# Patient Record
Sex: FEMALE | Race: WHITE | NOT HISPANIC OR LATINO | Employment: FULL TIME | URBAN - NONMETROPOLITAN AREA
[De-identification: names, ages, dates, MRNs, and addresses within clinical notes are randomized per-mention and may not be internally consistent; named-entity substitution may affect disease eponyms.]

---

## 2018-08-28 ENCOUNTER — OFFICE VISIT (OUTPATIENT)
Dept: URGENT CARE | Facility: CLINIC | Age: 38
End: 2018-08-28
Payer: COMMERCIAL

## 2018-08-28 VITALS
RESPIRATION RATE: 18 BRPM | SYSTOLIC BLOOD PRESSURE: 144 MMHG | HEIGHT: 67 IN | HEART RATE: 72 BPM | TEMPERATURE: 98.3 F | BODY MASS INDEX: 23.7 KG/M2 | DIASTOLIC BLOOD PRESSURE: 72 MMHG | OXYGEN SATURATION: 100 % | WEIGHT: 151 LBS

## 2018-08-28 DIAGNOSIS — M54.42 ACUTE LEFT-SIDED LOW BACK PAIN WITH LEFT-SIDED SCIATICA: Primary | ICD-10-CM

## 2018-08-28 PROCEDURE — 99203 OFFICE O/P NEW LOW 30 MIN: CPT | Performed by: PHYSICIAN ASSISTANT

## 2018-08-28 RX ORDER — FLUOXETINE 10 MG/1
20 CAPSULE ORAL
COMMUNITY
Start: 2018-06-24

## 2018-08-28 RX ORDER — VILAZODONE HYDROCHLORIDE 20 MG/1
20 TABLET ORAL
COMMUNITY
Start: 2018-06-18

## 2018-08-28 RX ORDER — METHYLPREDNISOLONE 4 MG/1
TABLET ORAL
Qty: 21 TABLET | Refills: 0 | Status: SHIPPED | OUTPATIENT
Start: 2018-08-28

## 2018-08-28 NOTE — PROGRESS NOTES
[address] [facility]  ([facility] [phone]  (fax) [phone]        NAME: Jc Mcmillan is a 45 y o  female  : 1980    MRN: [id number]  DATE: 2018  TIME: 1:15 PM    Assessment and Plan   Acute left-sided low back pain with left-sided sciatica [M54 42]  1  Acute left-sided low back pain with left-sided sciatica  Methylprednisolone 4 MG TBPK       Patient Instructions   To take medrol dose pack as prescribed  If no improvement in symptoms in next 2-3 days to follow up with Dr Vangie Guallpa for further evaluation and consideration of advanced imaging of spine  Discussed injection therapy with patient for which she has tried in the past  Also discussed different medications such as lyrica and gabapentin with patient which she can further discuss with surgeon  Patient did verbalize understanding  To present to the ER if symptoms worsen  Chief Complaint     Chief Complaint   Patient presents with    Leg Pain     Radiates from R buttock down the R leg x 1 week   Peggie Gosselin LPN         History of Present Illness   Jc Mcmillan presents to the clinic c/o  Patient denies any bowel/bladder issues or numbness  Leg Pain    The incident occurred more than 1 week ago  Incident location: skipping down a mtn  The pain is present in the left leg (posterior thigh into calf and foot)  The quality of the pain is described as aching and shooting  The pain is moderate  The pain has been constant since onset  Associated symptoms include muscle weakness, numbness (left foot (worse than her normal numbness per patient)) and tingling  Pertinent negatives include no inability to bear weight, loss of motion or loss of sensation  She reports no foreign bodies present  The symptoms are aggravated by movement, palpation and weight bearing  She has tried NSAIDs for the symptoms  The treatment provided no relief         Review of Systems   Review of Systems Constitutional: Negative for activity change, appetite change, chills, diaphoresis, fatigue and fever  HENT: Negative for congestion, ear discharge, ear pain, facial swelling, rhinorrhea, sinus pain, sinus pressure, sneezing and sore throat  Eyes: Negative for photophobia, pain, discharge, redness, itching and visual disturbance  Respiratory: Negative for apnea, cough, chest tightness, shortness of breath and wheezing  Cardiovascular: Negative for chest pain  Gastrointestinal: Negative for abdominal distention, abdominal pain, anal bleeding, blood in stool, constipation, diarrhea, nausea and vomiting  Genitourinary: Negative for dysuria, flank pain, frequency, hematuria and urgency  Musculoskeletal: Positive for arthralgias and back pain  Negative for gait problem, joint swelling, myalgias, neck pain and neck stiffness  Skin: Negative for color change, rash and wound  Allergic/Immunologic: Negative for immunocompromised state  Neurological: Positive for tingling, weakness and numbness (left foot (worse than her normal numbness per patient))  Negative for dizziness, facial asymmetry and headaches  Hematological: Negative for adenopathy  Psychiatric/Behavioral: Negative for confusion and decreased concentration           Current Medications     Long-Term Prescriptions   Medication Sig Dispense Refill    FLUoxetine (PROZAC) 10 mg capsule Take 20 mg by mouth      vilazodone (VIIBRYD) 20 mg tablet Take 20 mg by mouth         Current Allergies     Allergies as of 08/28/2018    (No Known Allergies)            The following portions of the patient's history were reviewed and updated as appropriate: allergies, current medications, past family history, past medical history, past social history, past surgical history and problem list   Past Medical History:   Diagnosis Date    Anxiety     Depression     Disease of thyroid gland      Past Surgical History:   Procedure Laterality Date    BACK SURGERY       SECTION      TONSILLECTOMY      TOTAL THYROIDECTOMY       Social History     Social History    Marital status: /Civil Union     Spouse name: N/A    Number of children: N/A    Years of education: N/A     Occupational History    Not on file  Social History Main Topics    Smoking status: Never Smoker    Smokeless tobacco: Never Used    Alcohol use No    Drug use: No    Sexual activity: Not on file     Other Topics Concern    Not on file     Social History Narrative    No narrative on file       Objective   /72 (BP Location: Right arm, Patient Position: Sitting, Cuff Size: Standard)   Pulse 72   Temp 98 3 °F (36 8 °C) (Tympanic)   Resp 18   Ht 5' 6 5" (1 689 m)   Wt 68 5 kg (151 lb)   LMP 2018   SpO2 100%   BMI 24 01 kg/m²      Physical Exam     Physical Exam   Constitutional: She is oriented to person, place, and time  She appears well-developed and well-nourished  No distress  HENT:   Head: Normocephalic and atraumatic  Right Ear: Tympanic membrane and external ear normal    Left Ear: Tympanic membrane and external ear normal    Nose: Nose normal    Mouth/Throat: Oropharynx is clear and moist  No oropharyngeal exudate  Eyes: Conjunctivae and EOM are normal  Pupils are equal, round, and reactive to light  Right eye exhibits no discharge  Left eye exhibits no discharge  No scleral icterus  Neck: Normal range of motion  Neck supple  No JVD present  No tracheal deviation present  No thyromegaly present  Cardiovascular: Normal rate, regular rhythm and normal heart sounds  Exam reveals no gallop and no friction rub  No murmur heard  Pulmonary/Chest: Effort normal and breath sounds normal  No stridor  No respiratory distress  She has no wheezes  She has no rales  She exhibits no tenderness  Abdominal: Soft  Bowel sounds are normal  She exhibits no distension and no mass  There is no tenderness  There is no rebound and no guarding  Musculoskeletal: She exhibits tenderness  She exhibits no deformity  Lumbar back: She exhibits decreased range of motion (limited flexion and extension due to pain), tenderness (left lower lumbar region L3-4, L4-5, L5-S1), bony tenderness and pain  She exhibits no swelling, no edema, no deformity, no laceration, no spasm and normal pulse  Left SLR at 35 degrees, right SLR at 40 degrees; decreased sensation at left L5, S1; 4/5 flexion and extension strength of left leg, 5/5 strength of right leg    Lymphadenopathy:     She has no cervical adenopathy  Neurological: She is alert and oriented to person, place, and time  She has normal reflexes  Coordination normal    Skin: Skin is warm and dry  No rash noted  She is not diaphoretic  No erythema  No pallor  Psychiatric: She has a normal mood and affect  Her behavior is normal  Judgment and thought content normal    Nursing note and vitals reviewed        Nini Salas PA-C

## 2020-01-15 ENCOUNTER — TRANSCRIBE ORDERS (OUTPATIENT)
Dept: ADMINISTRATIVE | Facility: HOSPITAL | Age: 40
End: 2020-01-15

## 2020-01-15 DIAGNOSIS — R10.13 ABDOMINAL PAIN, EPIGASTRIC: Primary | ICD-10-CM

## 2020-01-23 ENCOUNTER — HOSPITAL ENCOUNTER (OUTPATIENT)
Dept: RADIOLOGY | Facility: HOSPITAL | Age: 40
Discharge: HOME | End: 2020-01-23
Payer: COMMERCIAL

## 2020-01-23 DIAGNOSIS — R10.13 ABDOMINAL PAIN, EPIGASTRIC: ICD-10-CM

## 2020-01-23 PROCEDURE — 74246 X-RAY XM UPR GI TRC 2CNTRST: CPT

## 2024-04-26 ENCOUNTER — OFFICE VISIT (OUTPATIENT)
Dept: OBGYN CLINIC | Facility: CLINIC | Age: 44
End: 2024-04-26
Payer: COMMERCIAL

## 2024-04-26 ENCOUNTER — DOCUMENTATION (OUTPATIENT)
Dept: OBGYN CLINIC | Facility: CLINIC | Age: 44
End: 2024-04-26

## 2024-04-26 VITALS
SYSTOLIC BLOOD PRESSURE: 131 MMHG | HEART RATE: 71 BPM | WEIGHT: 148 LBS | DIASTOLIC BLOOD PRESSURE: 69 MMHG | BODY MASS INDEX: 23.23 KG/M2 | HEIGHT: 67 IN

## 2024-04-26 DIAGNOSIS — G56.01 CARPAL TUNNEL SYNDROME OF RIGHT WRIST: Primary | ICD-10-CM

## 2024-04-26 PROCEDURE — 99203 OFFICE O/P NEW LOW 30 MIN: CPT | Performed by: ORTHOPAEDIC SURGERY

## 2024-04-26 NOTE — PROGRESS NOTES
Assessment/Plan:   Diagnoses and all orders for this visit:    Carpal tunnel syndrome of right wrist  -     EMG 1 Limb; Future         Discussed with patient that her physical exam is consistent with carpal tunnel syndrome of her right wrist. An EMG was ordered to further evaluate her symptoms. She may continue use of the night splint as tolerated. She will follow-up once the EMG is complete. The patient expresses understanding and is in agreement with today's treatment plan.     The patient has carpal tunnel syndrome of her right wrist.  Nerve conduction studies were ordered.  Return back once complete      Subjective:   Patient ID: Tracey Frost  1980     HPI  Patient is a 44 y.o. female who presents for initial evaluation of her right wrist. The patient report pain and numbness for the past 2 months.  She was given a nocturnal wrist splint by her PCP which does not help any significantly    The following portions of the patient's history were reviewed and updated as appropriate:  Past medical history, past surgical history, Family history, social history, current medications and allergies    Past Medical History:   Diagnosis Date    Anxiety     not an active issue    Depression     not an active issue    Disease of thyroid gland        Past Surgical History:   Procedure Laterality Date    BACK SURGERY      L4-5 laminectomy     SECTION      TONSILLECTOMY      TOTAL THYROIDECTOMY Bilateral 2000    benign goiter       Family History   Problem Relation Age of Onset    No Known Problems Mother     Diabetes Father        Social History     Socioeconomic History    Marital status: /Civil Union     Spouse name: None    Number of children: None    Years of education: None    Highest education level: None   Occupational History    None   Tobacco Use    Smoking status: Never    Smokeless tobacco: Never   Vaping Use    Vaping status: Never Used   Substance and Sexual Activity    Alcohol use: No    Drug  "use: No    Sexual activity: None   Other Topics Concern    None   Social History Narrative    None     Social Determinants of Health     Financial Resource Strain: Not on file   Food Insecurity: Not on file   Transportation Needs: Not on file   Physical Activity: Not on file   Stress: Not on file   Social Connections: Not on file   Intimate Partner Violence: Not on file   Housing Stability: Not on file         Current Outpatient Medications:     levothyroxine 75 mcg tablet, Take 75 mcg by mouth daily, Disp: , Rfl:     FLUoxetine (PROZAC) 10 mg capsule, Take 20 mg by mouth (Patient not taking: Reported on 4/26/2024), Disp: , Rfl:     Methylprednisolone 4 MG TBPK, Use as directed on package (Patient not taking: Reported on 7/26/2021), Disp: 21 tablet, Rfl: 0    vilazodone (VIIBRYD) 20 mg tablet, Take 20 mg by mouth (Patient not taking: Reported on 7/26/2021), Disp: , Rfl:     No Known Allergies    Review of Systems   Constitutional:  Negative for chills, fever and unexpected weight change.   HENT:  Negative for hearing loss, nosebleeds and sore throat.    Eyes:  Negative for pain, redness and visual disturbance.   Respiratory:  Negative for cough, shortness of breath and wheezing.    Cardiovascular:  Negative for chest pain, palpitations and leg swelling.   Gastrointestinal:  Negative for abdominal pain, nausea and vomiting.   Endocrine: Negative for polydipsia and polyuria.   Genitourinary:  Negative for dysuria and hematuria.   Skin:  Negative for rash and wound.   Neurological:  Negative for dizziness, numbness and headaches.   Psychiatric/Behavioral:  Negative for decreased concentration and suicidal ideas. The patient is not nervous/anxious.    All other systems reviewed and are negative.       Objective:  /69   Pulse 71   Ht 5' 7\" (1.702 m)   Wt 67.1 kg (148 lb)   BMI 23.18 kg/m²     Ortho Exam  right wrist -  Patient presents with no obvious anatomical deformity  Skin is warm and dry to touch with no " signs of erythema, ecchymosis, infection  ROM WNL  Strength: 4/5 throughout  No soft tissue swelling or effusion noted  Full FDS, FDP, extensor mechanisms are intact  - Thenar atrophy, - intrinsic atrophy  + Tinel's at carpal tunnel  - Tinel's at cubital tunnel   + Phalen's sign  + Carpal Tunnel Compression  Demonstrates normal wrist, elbow, and shoulder motion  Forearm compartments are soft and supple  2+ Distal radial pulse with brisk capillary refill to the fingers  Radial, median, ulnar motor and sensory distribution intact  Sensation to light touch intact distally      Physical Exam  HENT:      Head: Normocephalic and atraumatic.      Nose: Nose normal.   Eyes:      Conjunctiva/sclera: Conjunctivae normal.   Cardiovascular:      Rate and Rhythm: Normal rate.   Pulmonary:      Effort: Pulmonary effort is normal.   Musculoskeletal:      Cervical back: Neck supple.   Skin:     General: Skin is warm and dry.      Capillary Refill: Capillary refill takes less than 2 seconds.   Neurological:      Mental Status: She is alert and oriented to person, place, and time.   Psychiatric:         Mood and Affect: Mood normal.         Behavior: Behavior normal.          Diagnostic Test Review:  No new imaging at time of visit.     Procedures   None performed.    Scribe Attestation      I,:   am acting as a scribe while in the presence of the attending physician.:       I,:   personally performed the services described in this documentation    as scribed in my presence.:

## 2024-09-18 ENCOUNTER — APPOINTMENT (OUTPATIENT)
Dept: RADIOLOGY | Facility: CLINIC | Age: 44
End: 2024-09-18
Payer: OTHER MISCELLANEOUS

## 2024-09-18 ENCOUNTER — OCCMED (OUTPATIENT)
Dept: URGENT CARE | Facility: CLINIC | Age: 44
End: 2024-09-18
Payer: OTHER MISCELLANEOUS

## 2024-09-18 DIAGNOSIS — S99.922A INJURY OF TOE ON LEFT FOOT, INITIAL ENCOUNTER: Primary | ICD-10-CM

## 2024-09-18 DIAGNOSIS — S99.922A INJURY OF TOE ON LEFT FOOT, INITIAL ENCOUNTER: ICD-10-CM

## 2024-09-18 PROCEDURE — G0383 LEV 4 HOSP TYPE B ED VISIT: HCPCS | Performed by: PHYSICIAN ASSISTANT

## 2024-09-18 PROCEDURE — 73660 X-RAY EXAM OF TOE(S): CPT

## 2024-09-18 PROCEDURE — 99284 EMERGENCY DEPT VISIT MOD MDM: CPT | Performed by: PHYSICIAN ASSISTANT

## 2024-09-20 ENCOUNTER — OFFICE VISIT (OUTPATIENT)
Dept: ENDOCRINOLOGY | Facility: CLINIC | Age: 44
End: 2024-09-20
Payer: COMMERCIAL

## 2024-09-20 ENCOUNTER — APPOINTMENT (OUTPATIENT)
Dept: LAB | Facility: CLINIC | Age: 44
End: 2024-09-20
Payer: COMMERCIAL

## 2024-09-20 VITALS
DIASTOLIC BLOOD PRESSURE: 80 MMHG | BODY MASS INDEX: 23.18 KG/M2 | SYSTOLIC BLOOD PRESSURE: 120 MMHG | OXYGEN SATURATION: 99 % | HEIGHT: 67 IN | HEART RATE: 74 BPM

## 2024-09-20 DIAGNOSIS — E03.8 HYPOTHYROIDISM DUE TO HASHIMOTO'S THYROIDITIS: Primary | ICD-10-CM

## 2024-09-20 DIAGNOSIS — E06.3 HYPOTHYROIDISM DUE TO HASHIMOTO'S THYROIDITIS: ICD-10-CM

## 2024-09-20 DIAGNOSIS — E55.9 VITAMIN D DEFICIENCY: ICD-10-CM

## 2024-09-20 DIAGNOSIS — R60.9 WATER RETENTION: ICD-10-CM

## 2024-09-20 DIAGNOSIS — E06.3 HYPOTHYROIDISM DUE TO HASHIMOTO'S THYROIDITIS: Primary | ICD-10-CM

## 2024-09-20 DIAGNOSIS — C73 THYROID CANCER (HCC): ICD-10-CM

## 2024-09-20 LAB
25(OH)D3 SERPL-MCNC: 12.3 NG/ML (ref 30–100)
T4 FREE SERPL-MCNC: 1.03 NG/DL (ref 0.61–1.12)
TSH SERPL DL<=0.05 MIU/L-ACNC: 1 UIU/ML (ref 0.45–4.5)

## 2024-09-20 PROCEDURE — 84207 ASSAY OF VITAMIN B-6: CPT

## 2024-09-20 PROCEDURE — 99205 OFFICE O/P NEW HI 60 MIN: CPT | Performed by: INTERNAL MEDICINE

## 2024-09-20 PROCEDURE — 84439 ASSAY OF FREE THYROXINE: CPT

## 2024-09-20 PROCEDURE — 84432 ASSAY OF THYROGLOBULIN: CPT

## 2024-09-20 PROCEDURE — 86376 MICROSOMAL ANTIBODY EACH: CPT

## 2024-09-20 PROCEDURE — 36415 COLL VENOUS BLD VENIPUNCTURE: CPT

## 2024-09-20 PROCEDURE — 86800 THYROGLOBULIN ANTIBODY: CPT

## 2024-09-20 PROCEDURE — 82306 VITAMIN D 25 HYDROXY: CPT

## 2024-09-20 PROCEDURE — 84443 ASSAY THYROID STIM HORMONE: CPT

## 2024-09-20 RX ORDER — LEVOTHYROXINE SODIUM 100 UG/1
100 TABLET ORAL DAILY
COMMUNITY
End: 2024-09-26 | Stop reason: CLARIF

## 2024-09-20 NOTE — PROGRESS NOTES
Tracey Frost 44 y.o. female MRN: [id number]    Encounter: [id number]      Assessment & Plan     Assessment:  This is a 44 y.o.-year-old female with hypothyroidism.    Plan:  Diagnoses and all orders for this visit:    Hypothyroidism due to Hashimoto's thyroiditis    Lab Results   Component Value Date    LNE7WOKRUAUC 0.010 (L) 06/30/2015    TSH 32.18 (H) 08/03/2024   Uncontrolled  Patient has history of fluctuating TSH as well as free T4.  Currently she is on generic levothyroxine.  She is taking 100 mcg of levothyroxine her dose was recently increased 5 to 6 weeks ago.  Will obtain TSH, free T4, thyroglobulin tumor marker.  She will need adjustment in dose based on the results.  Goal for TSH considering she has history of thyroid cancer is 0.5-2.0    She would Benefit from switching to brand Synthroid    I have discussed the thyroid, thyroid cancer, prognosis, potential treatment and long-term follow-up.   Discussed the importance of TSH suppression and thyroid cancer  -     T4, free; Future  -     TSH, 3rd generation; Future  -     Thyroid Antibodies Panel; Future  -     T4, free; Future  -     TSH, 3rd generation; Future    Vitamin D deficiency  Obtain vitamin D level, will need to replace vitamin D3 supplementation based on results  -     Vitamin D 25 hydroxy; Future    Thyroid cancer (HCC)  Pathology is unknown, as per patient she also was given radioactive iodine therapy.  Will obtain thyroglobulin tumor marker as baseline and follow the trend.  Will obtain neck ultrasound for lymph node mapping  -     US head neck lymph node mapping; Future  -     Thyroglobulin; Future  -     Thyroglobulin; Future    Water retention  Obtain vitamin B6 level  -     Vitamin B6; Future    Other orders  -     levothyroxine (Levoxyl) 100 mcg tablet; Take 100 mcg by mouth daily       I have spent a total time of 60 minutes in caring for this patient on the day of the visit/encounter including Diagnostic results, Prognosis, Risks  and benefits of tx options, Instructions for management, Patient and family education, Importance of tx compliance, Risk factor reductions, Impressions, Counseling / Coordination of care, Documenting in the medical record, Reviewing / ordering tests, medicine, procedures  , and Obtaining or reviewing history  .   CC:   Hypothyroidism, thyroid cancer    History of Present Illness     HPI:  Tracey Frost is 44 yr old woman with medical history of postsurgical hypothyroidism, history of total thyroidectomy for thyroid cancer diagnosed in June 2020 is here for establishing visit for management of postsurgical hypothyroidism and thyroid cancer.    Pathology report is not available from 2020  There is no recent record of thyroglobulin level or neck ultrasound done  Family history of thyroid cancer.  She denies exposure to radiation to the neck or face area  She gives history of I-131 therapy after total thyroidectomy     Her current dose of levothyroxine is 100 mcg daily.    She takes it on empty stomach 1 hour before breakfast.      She denies family history of thyroid cancer.    She has positive family history of hypothyroidism     no history of infertility, she has 3 children, normal pregnancies  She has history of total thyroidectomy in 2000, for thyroid cancer.    Review of Systems   Constitutional:  Negative for activity change, diaphoresis, fatigue, fever and unexpected weight change.   HENT: Negative.     Eyes:  Negative for visual disturbance.   Respiratory:  Negative for cough, chest tightness and shortness of breath.    Cardiovascular:  Negative for chest pain, palpitations and leg swelling.   Gastrointestinal:  Negative for abdominal pain, blood in stool, constipation, diarrhea, nausea and vomiting.   Endocrine: Negative for cold intolerance, heat intolerance, polydipsia, polyphagia and polyuria.   Genitourinary:  Negative for dysuria, enuresis, frequency and urgency.   Musculoskeletal:  Negative for  "arthralgias and myalgias.   Skin:  Negative for pallor, rash and wound.   Allergic/Immunologic: Negative.    Neurological:  Negative for dizziness, tremors, weakness and numbness.   Hematological: Negative.    Psychiatric/Behavioral:  Positive for sleep disturbance.        Historical Information   Past Medical History:   Diagnosis Date    Anxiety     not an active issue    Depression     not an active issue    Disease of thyroid gland      Past Surgical History:   Procedure Laterality Date    BACK SURGERY      L4-5 laminectomy     SECTION      TONSILLECTOMY      TOTAL THYROIDECTOMY Bilateral 2000    benign goiter     Social History   Social History     Substance and Sexual Activity   Alcohol Use No     Social History     Substance and Sexual Activity   Drug Use No     Social History     Tobacco Use   Smoking Status Never   Smokeless Tobacco Never     Family History:   Family History   Problem Relation Age of Onset    No Known Problems Mother     Diabetes Father        Meds/Allergies   Current Outpatient Medications   Medication Sig Dispense Refill    levothyroxine (Levoxyl) 100 mcg tablet Take 100 mcg by mouth daily      FLUoxetine (PROZAC) 10 mg capsule Take 20 mg by mouth (Patient not taking: Reported on 2024)      Methylprednisolone 4 MG TBPK Use as directed on package (Patient not taking: Reported on 2021) 21 tablet 0    vilazodone (VIIBRYD) 20 mg tablet Take 20 mg by mouth (Patient not taking: Reported on 2021)       No current facility-administered medications for this visit.     No Known Allergies    Objective   Vitals: Blood pressure 120/80, pulse 74, height 5' 7\" (1.702 m), SpO2 99%.    Physical Exam  Constitutional:       General: She is not in acute distress.     Appearance: She is well-developed. She is not diaphoretic.   HENT:      Head: Normocephalic and atraumatic.   Eyes:      General:         Right eye: No discharge.         Left eye: No discharge.      Extraocular Movements: " "EOM normal.      Conjunctiva/sclera: Conjunctivae normal.      Pupils: Pupils are equal, round, and reactive to light.   Neck:      Thyroid: No thyromegaly.      Trachea: No tracheal deviation.   Cardiovascular:      Rate and Rhythm: Normal rate and regular rhythm.      Heart sounds: Normal heart sounds. No murmur heard.     No friction rub.   Pulmonary:      Effort: Pulmonary effort is normal. No respiratory distress.      Breath sounds: Normal breath sounds. No wheezing or rales.   Chest:      Chest wall: No tenderness.   Abdominal:      General: Bowel sounds are normal. There is no distension.      Palpations: Abdomen is soft.      Tenderness: There is no abdominal tenderness.   Musculoskeletal:         General: No tenderness, deformity or edema. Normal range of motion.      Cervical back: Normal range of motion and neck supple.   Lymphadenopathy:      Cervical: No cervical adenopathy.   Skin:     General: Skin is warm and dry.      Coloration: Skin is not pale.      Findings: No erythema or rash.   Neurological:      Mental Status: She is alert and oriented to person, place, and time.      Motor: No abnormal muscle tone.      Coordination: Coordination normal.      Deep Tendon Reflexes: Reflexes are normal and symmetric. Reflexes normal.   Psychiatric:         Mood and Affect: Mood and affect normal.         Behavior: Behavior normal.         The history was obtained from the review of the chart, patient.    Lab Results:        Imaging Studies:       Reviewed radiology reports from this admission including: MRI spine.    Portions of the record may have been created with voice recognition software. Occasional wrong word or \"sound a like\" substitutions may have occurred due to the inherent limitations of voice recognition software. Read the chart carefully and recognize, using context, where substitutions have occurred.    "

## 2024-09-21 LAB
THYROGLOB AB SERPL-ACNC: <1 IU/ML (ref 0–0.9)
THYROGLOB AB SERPL-ACNC: <1 IU/ML (ref 0–0.9)
THYROGLOB SERPL-MCNC: <0.1 NG/ML (ref 1.5–38.5)

## 2024-09-22 LAB — THYROPEROXIDASE AB SERPL-ACNC: 9 IU/ML (ref 0–34)

## 2024-09-25 ENCOUNTER — PATIENT MESSAGE (OUTPATIENT)
Dept: ENDOCRINOLOGY | Facility: CLINIC | Age: 44
End: 2024-09-25

## 2024-09-25 ENCOUNTER — APPOINTMENT (OUTPATIENT)
Dept: URGENT CARE | Facility: CLINIC | Age: 44
End: 2024-09-25
Payer: OTHER MISCELLANEOUS

## 2024-09-25 DIAGNOSIS — E06.3 HYPOTHYROIDISM DUE TO HASHIMOTO'S THYROIDITIS: Primary | ICD-10-CM

## 2024-09-25 DIAGNOSIS — E03.8 HYPOTHYROIDISM DUE TO HASHIMOTO'S THYROIDITIS: Primary | ICD-10-CM

## 2024-09-25 LAB — VIT B6 SERPL-MCNC: 5 UG/L (ref 3.4–65.2)

## 2024-09-25 PROCEDURE — 99213 OFFICE O/P EST LOW 20 MIN: CPT | Performed by: PHYSICIAN ASSISTANT

## 2024-09-26 ENCOUNTER — TELEPHONE (OUTPATIENT)
Age: 44
End: 2024-09-26

## 2024-09-26 RX ORDER — LEVOTHYROXINE SODIUM 100 MCG
100 TABLET ORAL DAILY
Qty: 90 TABLET | Refills: 1 | Status: SHIPPED | OUTPATIENT
Start: 2024-09-26

## 2024-09-26 NOTE — TELEPHONE ENCOUNTER
Caller: Cadence Engel Nurse  [facility].    Doctor and/or Office: Dr. Treadwell/Mark    #: [phone]    Escalation: Appointment Patient has a nondisplaced left great toe fracture. Requesting an appt in [place]. How soon should patient be seen? Please return call to patient Tracey to schedule.   This is workmans comp. Once she has an appt, claim info will be updated in system by Kristy at [facility]. Thank you

## 2024-10-03 ENCOUNTER — OFFICE VISIT (OUTPATIENT)
Age: 44
End: 2024-10-03
Payer: OTHER MISCELLANEOUS

## 2024-10-03 VITALS — DIASTOLIC BLOOD PRESSURE: 75 MMHG | HEART RATE: 92 BPM | SYSTOLIC BLOOD PRESSURE: 113 MMHG

## 2024-10-03 DIAGNOSIS — M79.675 PAIN OF LEFT GREAT TOE: Primary | ICD-10-CM

## 2024-10-03 PROCEDURE — 99203 OFFICE O/P NEW LOW 30 MIN: CPT

## 2024-10-03 NOTE — PROGRESS NOTES
Ambulatory Visit  Name: Tracey Frost      : 1980      MRN: [id number]  Encounter Provider: Richard Treadwell DPM  Encounter Date: 10/3/2024   Encounter department: [facility] PODIAT[facility]    Assessment & Plan  Pain of left great toe    Orders:    XR toe left great min 2 views; Future    Post Op Shoe    Treatment Plan:     Rest, ice, and elevation  Apply a cloth covered ice pack to heel four times per day 15-30 minutes.     My personal interpretation today of the x-rays that were taken show no acute osseous abnormality    Patient's x-rays show fracture that can be treated conservatively.  I discussed with the patient risks and benefits of treating this fracture conservatively.    Patient should wear device that was dispensed today for protection of the area.    Weightbearing status as tolerated in surgical shoe    Plan on rechecking x-rays in 2 weeks.      I personally discussed with patient at the visit today:     The diagnosis of this encounter  2.   Possible etiologies of the diagnosis  3.   Treatment options including advantages and disadvantages of treatment with potential risks and complications associated with these treatments  4.   Prevention strategies and ways to decrease pain     I answered all of the patients questions.     History of Present Illness     Tracey Frost is a 44 y.o. female who presents left great toe pain.  Patient initially injured her great toe 2024, she is here for further evaluation today she states there has been some improvement in pain.  She reports that still hurts to bend but otherwise is doing well.      History obtained from : patient  Review of Systems  Current Outpatient Medications on File Prior to Visit   Medication Sig Dispense Refill    Synthroid 100 MCG tablet Take 1 tablet (100 mcg total) by mouth daily 90 tablet 1    FLUoxetine (PROZAC) 10 mg capsule Take 20 mg by mouth (Patient not taking: Reported on 2024)      Methylprednisolone 4  MG TBPK Use as directed on package (Patient not taking: Reported on 7/26/2021) 21 tablet 0    vilazodone (VIIBRYD) 20 mg tablet Take 20 mg by mouth (Patient not taking: Reported on 7/26/2021)       No current facility-administered medications on file prior to visit.          Objective     /75 (BP Location: Left arm, Patient Position: Sitting, Cuff Size: Large)   Pulse 92     Physical Exam  Vascular: Intact pedal pulses bilateral DP and PT.  Neurological: Gross protective sensation intact bilateral  Musculoskeletal: Muscle strength bilateral intact with dorsiflexion, inversion, eversion and plantarflexion.  Tenderness on palpation noted at the level of the foot fracture left great toe.  There is no tenderness on palpation of the Lisfranc ligament.  No tenderness with palpation at the level of the ankle medially or laterally.  No tenderness with tib-fib squeeze.  No other areas of significant discomfort or tenderness on examination.  Dermatological: No open lesions or ulcerations noted bilateral.

## 2024-10-03 NOTE — LETTER
October 3, 2024     Patient: Tracey Frost  YOB: 1980  Date of Visit: 10/3/2024      To Whom it May Concern:    Tracey Frost is under my professional care. Tracey was seen in my office on 10/3/2024. Tracey may return to work with limitations 30% weightbearing, must remain in surgical shoe . Has follow up appointment in 2 weeks.    If you have any questions or concerns, please don't hesitate to call.         Sincerely,          Richard Treadwell DPM        CC: No Recipients

## 2024-10-21 ENCOUNTER — OFFICE VISIT (OUTPATIENT)
Age: 44
End: 2024-10-21
Payer: OTHER MISCELLANEOUS

## 2024-10-21 VITALS
HEIGHT: 67 IN | DIASTOLIC BLOOD PRESSURE: 76 MMHG | HEART RATE: 80 BPM | WEIGHT: 148 LBS | SYSTOLIC BLOOD PRESSURE: 117 MMHG | BODY MASS INDEX: 23.23 KG/M2

## 2024-10-21 DIAGNOSIS — M79.675 PAIN OF LEFT GREAT TOE: Primary | ICD-10-CM

## 2024-10-21 PROCEDURE — 99213 OFFICE O/P EST LOW 20 MIN: CPT

## 2024-10-21 NOTE — LETTER
October 31, 2024     Patient: Tracey Frost  YOB: 1980  Date of Visit: 10/21/2024      To Whom it May Concern:    Tracey Frost is under my professional care. Tracey was seen in my office on 10/21/2024. Tracey may return to work with no limitations. Patient is fully healed.    If you have any questions or concerns, please don't hesitate to call.         Sincerely,          Richard Treadwell DPM        CC: No Recipients

## 2024-10-21 NOTE — PROGRESS NOTES
Ambulatory Visit  Name: Tracey Frost      : 1980      MRN: [id number]  Encounter Provider: Richard Treadwell DPM  Encounter Date: 10/21/2024   Encounter department: [facility] PODIATRY [facility]    Assessment & Plan  Pain of left great toe         Treatment Plan:     Rest, ice, and elevation  Apply a cloth covered ice pack to heel four times per day 15-30 minutes.     My personal interpretation today of the x-rays that were taken show no acute osseous abnormality    Patient's x-rays show fracture that can be treated conservatively.  I discussed with the patient risks and benefits of treating this fracture conservatively.    Patient should wear device that was dispensed today for protection of the area.    Weightbearing status as tolerated in surgical shoe    Plan on rechecking x-rays in 2 weeks.      I personally discussed with patient at the visit today:     The diagnosis of this encounter  2.   Possible etiologies of the diagnosis  3.   Treatment options including advantages and disadvantages of treatment with potential risks and complications associated with these treatments  4.   Prevention strategies and ways to decrease pain     I answered all of the patients questions.     History of Present Illness     Tracey Frost is a 44 y.o. female who presents left great toe pain.  Patient initially injured her great toe 2024, she is here for further evaluation today she states there has been some improvement in pain.  She reports that still hurts to bend but otherwise is doing well.      History obtained from : patient  Review of Systems  Current Outpatient Medications on File Prior to Visit   Medication Sig Dispense Refill    Synthroid 100 MCG tablet Take 1 tablet (100 mcg total) by mouth daily 90 tablet 1    FLUoxetine (PROZAC) 10 mg capsule Take 20 mg by mouth (Patient not taking: Reported on 2024)      Methylprednisolone 4 MG TBPK Use as directed on package (Patient not taking:  "Reported on 7/26/2021) 21 tablet 0    vilazodone (VIIBRYD) 20 mg tablet Take 20 mg by mouth (Patient not taking: Reported on 7/26/2021)       No current facility-administered medications on file prior to visit.          Objective     /76 (BP Location: Left arm, Patient Position: Sitting, Cuff Size: Large)   Pulse 80   Ht 5' 7\" (1.702 m)   Wt 67.1 kg (148 lb)   BMI 23.18 kg/m²     Physical Exam  Vascular: Intact pedal pulses bilateral DP and PT.  Neurological: Gross protective sensation intact bilateral  Musculoskeletal: Muscle strength bilateral intact with dorsiflexion, inversion, eversion and plantarflexion.  Tenderness on palpation noted at the level of the foot fracture left great toe.  There is no tenderness on palpation of the Lisfranc ligament.  No tenderness with palpation at the level of the ankle medially or laterally.  No tenderness with tib-fib squeeze.  No other areas of significant discomfort or tenderness on examination.  Dermatological: No open lesions or ulcerations noted bilateral.  "

## 2024-10-30 ENCOUNTER — TELEPHONE (OUTPATIENT)
Age: 44
End: 2024-10-30

## 2024-10-30 NOTE — TELEPHONE ENCOUNTER
Caller: Tracey Frost    Doctor: Mile / Mark    Reason for call: Tracey needs a note saying that she is able to go back to work.  That she has no more work limitations and that she is completely healed.  Can we please provide Tracey with this note?  Please send to her my chart.  Thank you.      Call back#: [phone]

## 2024-12-27 DIAGNOSIS — E06.3 HYPOTHYROIDISM DUE TO HASHIMOTO'S THYROIDITIS: ICD-10-CM

## 2024-12-27 RX ORDER — LEVOTHYROXINE SODIUM 100 MCG
100 TABLET ORAL DAILY
Qty: 90 TABLET | Refills: 1 | Status: SHIPPED | OUTPATIENT
Start: 2024-12-27

## 2025-03-15 ENCOUNTER — OFFICE VISIT (OUTPATIENT)
Dept: URGENT CARE | Facility: CLINIC | Age: 45
End: 2025-03-15
Payer: COMMERCIAL

## 2025-03-15 VITALS
DIASTOLIC BLOOD PRESSURE: 72 MMHG | HEIGHT: 67 IN | WEIGHT: 209 LBS | RESPIRATION RATE: 18 BRPM | TEMPERATURE: 98.2 F | SYSTOLIC BLOOD PRESSURE: 124 MMHG | HEART RATE: 100 BPM | OXYGEN SATURATION: 98 % | BODY MASS INDEX: 32.8 KG/M2

## 2025-03-15 DIAGNOSIS — R30.0 DYSURIA: Primary | ICD-10-CM

## 2025-03-15 LAB
SL AMB  POCT GLUCOSE, UA: ABNORMAL
SL AMB LEUKOCYTE ESTERASE,UA: ABNORMAL
SL AMB POCT BILIRUBIN,UA: ABNORMAL
SL AMB POCT BLOOD,UA: ABNORMAL
SL AMB POCT CLARITY,UA: ABNORMAL
SL AMB POCT COLOR,UA: YELLOW
SL AMB POCT KETONES,UA: 1
SL AMB POCT NITRITE,UA: ABNORMAL
SL AMB POCT PH,UA: 7
SL AMB POCT SPECIFIC GRAVITY,UA: 1
SL AMB POCT URINE PROTEIN: ABNORMAL
SL AMB POCT UROBILINOGEN: 0.2

## 2025-03-15 PROCEDURE — 99213 OFFICE O/P EST LOW 20 MIN: CPT

## 2025-03-15 PROCEDURE — 87086 URINE CULTURE/COLONY COUNT: CPT

## 2025-03-15 PROCEDURE — 81002 URINALYSIS NONAUTO W/O SCOPE: CPT

## 2025-03-15 RX ORDER — NITROFURANTOIN 25; 75 MG/1; MG/1
100 CAPSULE ORAL 2 TIMES DAILY
Qty: 10 CAPSULE | Refills: 0 | Status: SHIPPED | OUTPATIENT
Start: 2025-03-15 | End: 2025-03-20

## 2025-03-15 NOTE — PATIENT INSTRUCTIONS
Please take Nitrofurantoin 100 mg twice daily for the next 5 days.    Drink plenty of water and other fluids to help flush out bacteria and dilute your urine.    You may take OTC pain relievers such as Tylenol or Ibuprofen, and use a warm heating pad to help with your pain.  Avoid drinks that may irritate your bladder including coffee, alcohol, and citrus drinks.  Drinking cranberry juice or taking cranberry products may help reduce recurrent UTI's.      Please follow up with PCP or proceed to the ER for worsening symptoms (fever, chills, back/pelvic pain, and bloody urine).

## 2025-03-15 NOTE — PROGRESS NOTES
[facility]        NAME: Tracey Frost is a 45 y.o. female  : 1980    MRN: [id number]  DATE: March 15, 2025  TIME: 11:16 AM    Assessment and Plan   Dysuria [R30.0]  1. Dysuria  POCT urine dip    Urine culture    Urine culture    nitrofurantoin (MACROBID) 100 mg capsule        Urinalysis in office shows small amount of leukocytes and blood.  Will send urine culture.  Will treat empirically with Macrobid.  Recommend rest and increase fluid intake. Instructed patient to follow-up with PCP for no improvement or worsening of symptoms.  Patient educated on red flag symptoms and when to proceed to the ED.  Patient agreeable and understands current treatment plan.     Patient Instructions     Patient Instructions   Please take Nitrofurantoin 100 mg twice daily for the next 5 days.    Drink plenty of water and other fluids to help flush out bacteria and dilute your urine.    You may take OTC pain relievers such as Tylenol or Ibuprofen, and use a warm heating pad to help with your pain.  Avoid drinks that may irritate your bladder including coffee, alcohol, and citrus drinks.  Drinking cranberry juice or taking cranberry products may help reduce recurrent UTI's.      Please follow up with PCP or proceed to the ER for worsening symptoms (fever, chills, back/pelvic pain, and bloody urine).        Follow up with PCP in 3-5 days.  Proceed to  ER if symptoms worsen.    Chief Complaint     Chief Complaint   Patient presents with   • Possible UTI     Started today with feeling the need to urinate.          History of Present Illness       45-year-old female presents to the clinic for evaluation of urinary symptoms x 1 day.  Patient reports urinary frequency, hesitancy and urgency.  Patient denies any fevers, chills, burning with urination, hematuria, flank pain, back pain, pelvic pain, nausea, vomiting or diarrhea.  Patient denies any other symptoms at this time.  She states she is not taking any OTC medications  for symptoms.              Review of Systems   Review of Systems   Constitutional:  Negative for chills and fever.   Respiratory:  Negative for cough and shortness of breath.    Cardiovascular:  Negative for chest pain and palpitations.   Gastrointestinal:  Negative for abdominal pain, diarrhea, nausea and vomiting.   Genitourinary:  Positive for frequency and urgency. Negative for dysuria, flank pain, hematuria and pelvic pain.   Musculoskeletal:  Negative for arthralgias and myalgias.   Neurological:  Negative for dizziness, light-headedness and headaches.         Current Medications       Current Outpatient Medications:   •  nitrofurantoin (MACROBID) 100 mg capsule, Take 1 capsule (100 mg total) by mouth 2 (two) times a day for 5 days, Disp: 10 capsule, Rfl: 0  •  Synthroid 100 MCG tablet, Take 1 tablet (100 mcg total) by mouth daily, Disp: 90 tablet, Rfl: 1  •  FLUoxetine (PROZAC) 10 mg capsule, Take 20 mg by mouth (Patient not taking: Reported on 2024), Disp: , Rfl:   •  Methylprednisolone 4 MG TBPK, Use as directed on package (Patient not taking: Reported on 2021), Disp: 21 tablet, Rfl: 0  •  vilazodone (VIIBRYD) 20 mg tablet, Take 20 mg by mouth (Patient not taking: Reported on 2021), Disp: , Rfl:     Current Allergies     Allergies as of 03/15/2025   • (No Known Allergies)            The following portions of the patient's history were reviewed and updated as appropriate: allergies, current medications, past family history, past medical history, past social history, past surgical history and problem list.     Past Medical History:   Diagnosis Date   • Anxiety     not an active issue   • Depression     not an active issue   • Disease of thyroid gland        Past Surgical History:   Procedure Laterality Date   • BACK SURGERY      L4-5 laminectomy   •  SECTION     • TONSILLECTOMY     • TOTAL THYROIDECTOMY Bilateral 2000    benign goiter       Family History   Problem Relation Age of Onset  "  • No Known Problems Mother    • Diabetes Father          Medications have been verified.        Objective   /72   Pulse 100   Temp 98.2 °F (36.8 °C)   Resp 18   Ht 5' 7\" (1.702 m)   Wt 94.8 kg (209 lb)   SpO2 98%   BMI 32.73 kg/m²        Physical Exam     Physical Exam  Vitals and nursing note reviewed.   Constitutional:       Appearance: Normal appearance.   HENT:      Head: Normocephalic and atraumatic.   Cardiovascular:      Rate and Rhythm: Normal rate and regular rhythm.      Pulses: Normal pulses.      Heart sounds: Normal heart sounds.   Pulmonary:      Effort: Pulmonary effort is normal.      Breath sounds: Normal breath sounds.   Abdominal:      General: Bowel sounds are normal. There is no distension.      Palpations: Abdomen is soft.      Tenderness: There is no abdominal tenderness. There is no right CVA tenderness, left CVA tenderness or guarding.   Skin:     General: Skin is warm and dry.   Neurological:      General: No focal deficit present.      Mental Status: She is alert.   Psychiatric:         Mood and Affect: Mood normal.         Behavior: Behavior normal.                   "

## 2025-03-16 LAB — BACTERIA UR CULT: NORMAL

## 2025-03-24 ENCOUNTER — APPOINTMENT (OUTPATIENT)
Dept: LAB | Facility: CLINIC | Age: 45
End: 2025-03-24
Payer: COMMERCIAL

## 2025-03-24 ENCOUNTER — OFFICE VISIT (OUTPATIENT)
Dept: ENDOCRINOLOGY | Facility: CLINIC | Age: 45
End: 2025-03-24
Payer: COMMERCIAL

## 2025-03-24 VITALS
DIASTOLIC BLOOD PRESSURE: 80 MMHG | WEIGHT: 210 LBS | HEIGHT: 67 IN | BODY MASS INDEX: 32.96 KG/M2 | HEART RATE: 65 BPM | OXYGEN SATURATION: 94 % | SYSTOLIC BLOOD PRESSURE: 116 MMHG

## 2025-03-24 DIAGNOSIS — E66.9 OBESITY (BMI 30-39.9): ICD-10-CM

## 2025-03-24 DIAGNOSIS — E55.9 VITAMIN D DEFICIENCY: ICD-10-CM

## 2025-03-24 DIAGNOSIS — C73 THYROID CANCER (HCC): ICD-10-CM

## 2025-03-24 DIAGNOSIS — R63.5 WEIGHT GAIN: ICD-10-CM

## 2025-03-24 DIAGNOSIS — E89.0 POSTOPERATIVE HYPOTHYROIDISM: ICD-10-CM

## 2025-03-24 DIAGNOSIS — E89.0 POSTOPERATIVE HYPOTHYROIDISM: Primary | ICD-10-CM

## 2025-03-24 PROCEDURE — 86800 THYROGLOBULIN ANTIBODY: CPT

## 2025-03-24 PROCEDURE — 82306 VITAMIN D 25 HYDROXY: CPT

## 2025-03-24 PROCEDURE — 84432 ASSAY OF THYROGLOBULIN: CPT

## 2025-03-24 PROCEDURE — 84439 ASSAY OF FREE THYROXINE: CPT

## 2025-03-24 PROCEDURE — 84443 ASSAY THYROID STIM HORMONE: CPT

## 2025-03-24 PROCEDURE — 99214 OFFICE O/P EST MOD 30 MIN: CPT | Performed by: PHYSICIAN ASSISTANT

## 2025-03-24 PROCEDURE — 36415 COLL VENOUS BLD VENIPUNCTURE: CPT

## 2025-03-24 NOTE — ASSESSMENT & PLAN NOTE
TSH elevated 6.86 in Jan 2025  Synthroid dose not adjusted at that time  Check TFTs now and prior to next visit  Continue current dose of Synthroid for now  Orders:    T4, free; Future    TSH, 3rd generation; Future    T4, free; Future    TSH, 3rd generation; Future

## 2025-03-24 NOTE — ASSESSMENT & PLAN NOTE
Pathology report not available for review  Thyroglobulin previously negative   Advised to complete head neck ultrasound  Check labs again  Orders:    Thyroglobulin; Future    Thyroglobulin; Future

## 2025-03-24 NOTE — ASSESSMENT & PLAN NOTE
Vitamin D previously low at 12.3  Continue current treatment  Orders:    Vitamin D 25 hydroxy; Future    Vitamin D 25 hydroxy; Future

## 2025-03-24 NOTE — PROGRESS NOTES
Name: Tracey Frost      : 1980      MRN: [id number]  Encounter Provider: Dianna Gonsalez PA-C  Encounter Date: 3/24/2025   Encounter department: [facility] DIABETES AND ENDOCRINOLOGY [place]    CC: hypothyroidism  Assessment & Plan  Postoperative hypothyroidism  TSH elevated 6.86 in 2025  Synthroid dose not adjusted at that time  Check TFTs now and prior to next visit  Continue current dose of Synthroid for now  Orders:    T4, free; Future    TSH, 3rd generation; Future    T4, free; Future    TSH, 3rd generation; Future    Thyroid cancer (HCC)  Pathology report not available for review  Thyroglobulin previously negative   Advised to complete head neck ultrasound  Check labs again  Orders:    Thyroglobulin; Future    Thyroglobulin; Future    Vitamin D deficiency  Vitamin D previously low at 12.3  Continue current treatment  Orders:    Vitamin D 25 hydroxy; Future    Vitamin D 25 hydroxy; Future    Weight gain  Check 24-hour urine cortisol to evaluate for Cushing's  Recommended healthy diet and routine exercise as tolerated  Orders:    Cortisol, Free 24-Hour Urine LC/MS/MS; Future    Creatinine, urine, 24 hour; Future    Ambulatory Referral to Weight Management; Future    Obesity (BMI 30-39.9)  Referred to weight management  Orders:    Ambulatory Referral to Weight Management; Future        History of Present Illness     Tracey Frost is a 45 y.o. female here for follow-up of postsurgical hypothyroidism.  She had a history of a total thyroidectomy due to possible thyroid cancer which was diagnosed in 2000.  Pathology report is not available for review.  In 2024 she was switched from levothyroxine to brand Synthroid due to fluctuating TFTs.  She is currently taking Synthroid 100 mcg 1 tablet daily on an empty stomach at least 1 hour before breakfast and at least 4 hours apart from supplements.  In 2024 TSH was normal at 0.997 and free T4 normal at 1.03.  In 2025  TSH was elevated at 6.86.  Does not appear at that Synthroid dose was changed at that time.  Thyroid antibodies previously negative.  Thyroglobulin was previously undetectable.  Head neck ultrasound was previously ordered but not completed.    Vitamin D deficiency: Vitamin D previously low at 12.3.  She is currently taking vitamin D3 5000 IU daily.    Weight gain: she reports 50 lb weight gain in the past year. She reports eating healthy but only 2 meals a day. She does not eat snacks regularly and walks 1-2 miles a day. She has not changed her lifestyle in the past year.     Review of Systems   Constitutional:  Positive for fatigue. Negative for activity change, appetite change and unexpected weight change (weight gain).   HENT:  Negative for trouble swallowing.    Eyes:  Negative for visual disturbance.   Respiratory:  Negative for shortness of breath.    Cardiovascular:  Negative for chest pain and palpitations.   Gastrointestinal:  Negative for constipation and diarrhea.   Endocrine: Positive for cold intolerance. Negative for heat intolerance.   Musculoskeletal: Negative.    Skin: Negative.    Neurological:  Negative for tremors.   Psychiatric/Behavioral: Negative.      as per HPI  Current Outpatient Medications on File Prior to Visit   Medication Sig Dispense Refill    Synthroid 100 MCG tablet Take 1 tablet (100 mcg total) by mouth daily 90 tablet 1    [DISCONTINUED] FLUoxetine (PROZAC) 10 mg capsule Take 20 mg by mouth (Patient not taking: Reported on 4/26/2024)      [DISCONTINUED] Methylprednisolone 4 MG TBPK Use as directed on package (Patient not taking: Reported on 7/26/2021) 21 tablet 0    [DISCONTINUED] vilazodone (VIIBRYD) 20 mg tablet Take 20 mg by mouth (Patient not taking: Reported on 7/26/2021)       No current facility-administered medications on file prior to visit.         Medical History Reviewed by provider this encounter:  Tobacco  Allergies  Meds  Problems  Med Hx  Surg Hx  Fam Hx      ".    Objective   /80   Pulse 65   Ht 5' 7\" (1.702 m)   Wt 95.3 kg (210 lb)   SpO2 94%   BMI 32.89 kg/m²      Body mass index is 32.89 kg/m².  Wt Readings from Last 3 Encounters:   03/24/25 95.3 kg (210 lb)   03/15/25 94.8 kg (209 lb)   10/21/24 67.1 kg (148 lb)     Physical Exam  Vitals and nursing note reviewed.   Constitutional:       Appearance: She is well-developed.   HENT:      Head: Normocephalic.   Eyes:      General: No scleral icterus.  Neck:      Thyroid: No thyromegaly.   Cardiovascular:      Rate and Rhythm: Normal rate and regular rhythm.      Pulses:           Radial pulses are 2+ on the right side and 2+ on the left side.      Heart sounds: No murmur heard.  Pulmonary:      Effort: Pulmonary effort is normal. No respiratory distress.      Breath sounds: Normal breath sounds. No wheezing.   Musculoskeletal:      Cervical back: Neck supple.   Skin:     General: Skin is warm and dry.   Neurological:      Mental Status: She is alert.         Labs:   No results found for: \"HGBA1C\"  Lab Results   Component Value Date    CREATININE 0.73 10/27/2021    CREATININE 0.83 02/08/2019    BUN 15 10/27/2021    K 4.1 10/27/2021     10/27/2021    CO2 24 10/27/2021     GFR, Calculated   Date Value Ref Range Status   02/08/2019 89 >60 mL/min/1.73m2 Final     Comment:     mL/min per 1.73 square meters                                            Normal Function or Mild Renal    Disease (if clinically at risk):  >or=60  Moderately Decreased:                30-59  Severely Decreased:                  15-29  Renal Failure:                         <15                                            -American GFR: multiply reported GFR by 1.16    Please note that the eGFR is based on the CKD-EPI calculation, and is not intended to be used for drug dosing.                                            Note: Calculated GFR may not be an accurate indicator of renal function if the patient's renal function is not in " "a steady state.     No results found for: \"CHOL\", \"HDL\", \"LDL\", \"TRIG\", \"CHOLHDL\"  Lab Results   Component Value Date    ALT 10 10/27/2021    AST 7 10/27/2021    ALKPHOS 68 10/27/2021     Lab Results   Component Value Date    SPQ8UVSQWUAF 0.997 09/20/2024    RYE0HPWOOVOH 0.010 (L) 06/30/2015     Lab Results   Component Value Date    FREET4 1.03 09/20/2024       There are no Patient Instructions on file for this visit.    Discussed with the patient and all questioned fully answered. She will call me if any problems arise.    Administrative Statements   I have spent a total time of 30 minutes in caring for this patient on the day of the visit/encounter including Importance of tx compliance, Documenting in the medical record, Reviewing/placing orders in the medical record (including tests, medications, and/or procedures), and Obtaining or reviewing history  .  "

## 2025-03-24 NOTE — ASSESSMENT & PLAN NOTE
Check 24-hour urine cortisol to evaluate for Cushing's  Recommended healthy diet and routine exercise as tolerated  Orders:    Cortisol, Free 24-Hour Urine LC/MS/MS; Future    Creatinine, urine, 24 hour; Future    Ambulatory Referral to Weight Management; Future

## 2025-03-25 ENCOUNTER — RESULTS FOLLOW-UP (OUTPATIENT)
Dept: ENDOCRINOLOGY | Facility: CLINIC | Age: 45
End: 2025-03-25

## 2025-03-25 DIAGNOSIS — E55.9 VITAMIN D DEFICIENCY: Primary | ICD-10-CM

## 2025-03-25 LAB
25(OH)D3 SERPL-MCNC: 12.3 NG/ML (ref 30–100)
T4 FREE SERPL-MCNC: 1.02 NG/DL (ref 0.61–1.12)
TSH SERPL DL<=0.05 MIU/L-ACNC: 2.38 UIU/ML (ref 0.45–4.5)

## 2025-03-25 RX ORDER — ERGOCALCIFEROL 1.25 MG/1
50000 CAPSULE, LIQUID FILLED ORAL WEEKLY
Qty: 4 CAPSULE | Refills: 1 | Status: SHIPPED | OUTPATIENT
Start: 2025-03-25

## 2025-03-25 NOTE — TELEPHONE ENCOUNTER
----- Message from Dianna Gonsalez PA-C sent at 3/25/2025 10:36 AM EDT -----  Please call the patient regarding her abnormal result.  Thyroid labs are in acceptable range.  Please continue current dose of Synthroid.  Vitamin D level is low at 12.3.  I will prescribe booster doses of vitamin D 50,000 units to take once a week for 8 weeks.  After she completes the 8 weeks then she should transition to over-the-counter vitamin D3 5000 IU daily.

## 2025-03-26 LAB
THYROGLOB AB SERPL-ACNC: <1 IU/ML (ref 0–0.9)
THYROGLOB SERPL-MCNC: <0.1 NG/ML (ref 1.5–38.5)

## 2025-04-14 ENCOUNTER — OFFICE VISIT (OUTPATIENT)
Dept: BARIATRICS | Facility: CLINIC | Age: 45
End: 2025-04-14
Payer: COMMERCIAL

## 2025-04-14 VITALS
WEIGHT: 211.8 LBS | HEART RATE: 82 BPM | DIASTOLIC BLOOD PRESSURE: 75 MMHG | RESPIRATION RATE: 16 BRPM | HEIGHT: 65 IN | BODY MASS INDEX: 35.29 KG/M2 | SYSTOLIC BLOOD PRESSURE: 124 MMHG | TEMPERATURE: 98.4 F

## 2025-04-14 DIAGNOSIS — E66.9 OBESITY (BMI 30-39.9): Primary | ICD-10-CM

## 2025-04-14 DIAGNOSIS — R63.5 WEIGHT GAIN: ICD-10-CM

## 2025-04-14 DIAGNOSIS — E89.0 POST-OPERATIVE HYPOTHYROIDISM: ICD-10-CM

## 2025-04-14 PROCEDURE — 99204 OFFICE O/P NEW MOD 45 MIN: CPT | Performed by: PHYSICIAN ASSISTANT

## 2025-04-14 RX ORDER — TIRZEPATIDE 2.5 MG/.5ML
2.5 INJECTION, SOLUTION SUBCUTANEOUS WEEKLY
Qty: 2 ML | Refills: 0 | Status: SHIPPED | OUTPATIENT
Start: 2025-04-14 | End: 2025-05-12

## 2025-04-14 NOTE — ASSESSMENT & PLAN NOTE
-Discussed options of HealthyCORE-Intensive Lifestyle Intervention Program and Very Low Calorie Diet-VLCD , healthycore program and the role of weight loss medications.Explained the importance of making lifestyle changes if utilizing medication to aid in weight loss  -Initial weight loss goal of 5-10% weight loss for improved health  -Screening labs and records reviewed from prior. TSH now normal  - STOP BANG-negative    -Patient is interested in pursuing Conservative Program. Would recommend RD visit but she declined currenlty    Goals:  -Food log (ie.) [email or web address] calories  -avoid skipping meals  - To drink at least 64oz of water daily.No sugary beverages.  -recommend strength training.     To start on zepbound. To start on initial dose of medication and then to titrate as tolerated.  They have tried more than 6 months of lifestyle modifications including diet and activity changes and has had insignificant weight loss of less than 1 lb a week. Patient denies personal and family history of MCT and MEN2 tumors. Patient denies personal history of pancreatitis. Side effects discussed but not limited to diarrhea, bloating, constipation, GI upset, heartburn, increased heart rate, headache, low blood sugar, fatigue and dizziness. Titration and medication administration discussed.  Medication agreement signed 4/14/25. Contraception: tubal ligation    Initial Weight:211.8  ]       er

## 2025-04-14 NOTE — PROGRESS NOTES
Assessment/Plan:    Obesity (BMI 30-39.9)  -Discussed options of HealthyCORE-Intensive Lifestyle Intervention Program and Very Low Calorie Diet-VLCD , healthycore program and the role of weight loss medications.Explained the importance of making lifestyle changes if utilizing medication to aid in weight loss  -Initial weight loss goal of 5-10% weight loss for improved health  -Screening labs and records reviewed from prior. TSH now normal  - STOP BANG-negative    -Patient is interested in pursuing Conservative Program. [facility]    Goals:  -Food log (ie.) [email or web address] calories  -avoid skipping meals  - To drink at least 64oz of water daily.No sugary beverages.  -recommend strength training.     To start on zepbound. To start on initial dose of medication and then to titrate as tolerated.  They have tried more than 6 months of lifestyle modifications including diet and activity changes and has had insignificant weight loss of less than 1 lb a week. Patient denies personal and family history of MCT and MEN2 tumors. Patient denies personal history of pancreatitis. Side effects discussed but not limited to diarrhea, bloating, constipation, GI upset, heartburn, increased heart rate, headache, low blood sugar, fatigue and dizziness. Titration and medication administration discussed.  Medication agreement signed 4/14/25. Contraception: tubal ligation    Initial Weight:211.8  ]        Post-operative hypothyroidism  Last TSH wnl  and on synthroid.  Unsure if hypothyroidism is contributing to weight gain prior      No follow-ups on file.    Diagnoses and all orders for this visit:    Obesity (BMI 30-39.9)  -     Ambulatory Referral to Weight Management  -     tirzepatide (Zepbound) 2.5 mg/0.5 mL auto-injector; Inject 0.5 mL (2.5 mg total) under the skin once a week for 28 days    Weight gain  -     Ambulatory Referral to Weight Management  -      tirzepatide (Zepbound) 2.5 mg/0.5 mL auto-injector; Inject 0.5 mL (2.5 mg total) under the skin once a week for 28 days    Post-operative hypothyroidism    BMI 35.0-35.9,adult          Subjective:   Chief Complaint   Patient presents with    Consult     MWM-Consult; Waist- 43in; SB-       Patient ID: Tracey Frost  is a 45 y.o. female with excess weight/obesity here to pursue weight management.    Past Medical History:   Diagnosis Date    Anxiety     not an active issue    Depression     not an active issue    Disease of thyroid gland     Obesity (BMI 30-39.9) 3/24/2025       HPI: Here for MWM consult  Noticed weight gain over the last year.  Gained about 60 lb. No changes in activity.  No emotional eating or cravings.  She does walk daily for exercise.  She has been treated for hypothyroidism and was hypothyroid in August.        She Does get regular menses.      Obesity/Excess Weight:  Severity:  bmi 35  Onset:  last year    Contributing factors:  unsure    Hydration:3 16 oz bottles of water, glass milk, can of soda, 1 coffee black  Alcohol: none  Exercise:walking 2 miles daily (40-60 minutes)  Occupation:  Sleep:7-9 hours       Diet Recall  B: banana and yogurt  L: sometimes skips or tuna/cracker or deli meat/cheese  D: varies- often chicken, vegetable    Snack: not often -sometimes candy  The following portions of the patient's history were reviewed and updated as appropriate: She  has a past medical history of Anxiety, Depression, Disease of thyroid gland, and Obesity (BMI 30-39.9) (3/24/2025).  She   Patient Active Problem List    Diagnosis Date Noted    Post-operative hypothyroidism 2025    Postoperative hypothyroidism 2025    Thyroid cancer (HCC) 2025    Vitamin D deficiency 2025    Weight gain 2025    Obesity (BMI 30-39.9) 2025    Pain of left great toe 10/03/2024     She  has a past surgical history that includes Tonsillectomy;  section; Total  "thyroidectomy (Bilateral, 2000); Back surgery; and Tubal ligation.  Her family history includes Diabetes in her father; No Known Problems in her mother.  She  reports that she has never smoked. She has never used smokeless tobacco. She reports that she does not drink alcohol and does not use drugs.  Current Outpatient Medications   Medication Sig Dispense Refill    ergocalciferol (VITAMIN D2) 50,000 units Take 1 capsule (50,000 Units total) by mouth once a week 4 capsule 1    Synthroid 100 MCG tablet Take 1 tablet (100 mcg total) by mouth daily 90 tablet 1    tirzepatide (Zepbound) 2.5 mg/0.5 mL auto-injector Inject 0.5 mL (2.5 mg total) under the skin once a week for 28 days 2 mL 0     No current facility-administered medications for this visit.     Current Outpatient Medications on File Prior to Visit   Medication Sig    ergocalciferol (VITAMIN D2) 50,000 units Take 1 capsule (50,000 Units total) by mouth once a week    Synthroid 100 MCG tablet Take 1 tablet (100 mcg total) by mouth daily     No current facility-administered medications on file prior to visit.     She has no known allergies..    Review of Systems   Constitutional:  Positive for fatigue. Negative for fever.   Respiratory:  Negative for shortness of breath.    Cardiovascular:  Negative for chest pain and palpitations.   Gastrointestinal:  Negative for abdominal pain, constipation, diarrhea and vomiting.   Genitourinary:  Negative for difficulty urinating.   Musculoskeletal:  Negative for arthralgias and back pain.   Skin:  Negative for rash.   Neurological:  Negative for headaches.   Psychiatric/Behavioral:  Negative for dysphoric mood. The patient is not nervous/anxious.        Objective:    /75   Pulse 82   Temp 98.4 °F (36.9 °C)   Resp 16   Ht 5' 5\" (1.651 m)   Wt 96.1 kg (211 lb 12.8 oz)   BMI 35.25 kg/m²     Physical Exam  Vitals and nursing note reviewed.   Constitutional:       General: She is not in acute distress.     " Appearance: She is well-developed. She is obese.   HENT:      Head: Normocephalic and atraumatic.   Eyes:      Conjunctiva/sclera: Conjunctivae normal.   Neck:      Thyroid: No thyromegaly.   Pulmonary:      Effort: Pulmonary effort is normal. No respiratory distress.   Skin:     Findings: No rash (visible).   Neurological:      Mental Status: She is alert and oriented to person, place, and time.   Psychiatric:         Behavior: Behavior normal.

## 2025-04-14 NOTE — PROGRESS NOTES
.Assessment/Plan:    No problem-specific Assessment & Plan notes found for this encounter.      No follow-ups on file.      Diagnoses and all orders for this visit:    Weight gain  -     Ambulatory Referral to Weight Management  -     tirzepatide (Zepbound) 2.5 mg/0.5 mL auto-injector; Inject 0.5 mL (2.5 mg total) under the skin once a week for 28 days    Obesity (BMI 30-39.9)  -     Ambulatory Referral to Weight Management  -     tirzepatide (Zepbound) 2.5 mg/0.5 mL auto-injector; Inject 0.5 mL (2.5 mg total) under the skin once a week for 28 days          Subjective:   Chief Complaint   Patient presents with    Consult     MWM-Consult; Waist- 43in; SB-       Patient ID: Tracey Frost  is a 45 y.o. female with excess weight/obesity here to pursue weight management.    Past Medical History:   Diagnosis Date    Anxiety     not an active issue    Depression     not an active issue    Disease of thyroid gland     Obesity (BMI 30-39.9) 3/24/2025       HPI: Here for MWM consult    Obesity/Excess Weight:  Severity: {OBESITY (Optional):45526}  Onset:  ***    Modifiers: {MODIFIERS (Optional):87070}  Contributing factors: {CONTRIBUTING FACTORS (Optional):87312}    Hydration:  Alcohol:   Exercise:  Occupation:  Sleep:  Dining out/takeout:        The following portions of the patient's history were reviewed and updated as appropriate: She  has a past medical history of Anxiety, Depression, Disease of thyroid gland, and Obesity (BMI 30-39.9) (3/24/2025).  She   Patient Active Problem List    Diagnosis Date Noted    Postoperative hypothyroidism 2025    Thyroid cancer (HCC) 2025    Vitamin D deficiency 2025    Weight gain 2025    Obesity (BMI 30-39.9) 2025    Pain of left great toe 10/03/2024     She  has a past surgical history that includes Tonsillectomy;  section; Total thyroidectomy (Bilateral, ); Back surgery; and Tubal ligation.  Her family history includes Diabetes in her  "father; No Known Problems in her mother.  She  reports that she has never smoked. She has never used smokeless tobacco. She reports that she does not drink alcohol and does not use drugs.  Current Outpatient Medications   Medication Sig Dispense Refill    ergocalciferol (VITAMIN D2) 50,000 units Take 1 capsule (50,000 Units total) by mouth once a week 4 capsule 1    Synthroid 100 MCG tablet Take 1 tablet (100 mcg total) by mouth daily 90 tablet 1    tirzepatide (Zepbound) 2.5 mg/0.5 mL auto-injector Inject 0.5 mL (2.5 mg total) under the skin once a week for 28 days 2 mL 0     No current facility-administered medications for this visit.     Current Outpatient Medications on File Prior to Visit   Medication Sig    ergocalciferol (VITAMIN D2) 50,000 units Take 1 capsule (50,000 Units total) by mouth once a week    Synthroid 100 MCG tablet Take 1 tablet (100 mcg total) by mouth daily     No current facility-administered medications on file prior to visit.     She has no known allergies..    Review of Systems    Objective:    /75   Pulse 82   Temp 98.4 °F (36.9 °C)   Resp 16   Ht 5' 5\" (1.651 m)   Wt 96.1 kg (211 lb 12.8 oz)   BMI 35.25 kg/m²     Physical Exam      "

## 2025-04-17 ENCOUNTER — TELEPHONE (OUTPATIENT)
Dept: BARIATRICS | Facility: CLINIC | Age: 45
End: 2025-04-17

## 2025-04-17 NOTE — TELEPHONE ENCOUNTER
PA for Zepbound 2.5mg SUBMITTED to [employer]     via    [x]CMM-KEY: [id number]  []Surescripts-Case ID #   []Availity-Auth ID # NDC #   []Faxed to plan   []Other website   []Phone call Case ID #     []PA sent as URGENT    All office notes, labs and other pertaining documents and studies sent. Clinical questions answered. Awaiting determination from insurance company.     Turnaround time for your insurance to make a decision on your Prior Authorization can take 7-21 business days.

## 2025-04-18 NOTE — TELEPHONE ENCOUNTER
PA for Zepbound 2.5mg APPROVED     Date(s) approved 4/16/2025 - 10/16/2025    Case #    Patient advised by          [x]App in the [employer] Message  []Phone call   []LMOM  []L/M to call office as no active Communication consent on file  []Unable to leave detailed message as VM not approved on Communication consent       Pharmacy advised by    [x]Fax  []Phone call  []Secure Chat    Specialty Pharmacy    []     Approval letter scanned into Media No - from covermymeds

## 2025-05-10 DIAGNOSIS — E66.9 OBESITY (BMI 30-39.9): ICD-10-CM

## 2025-05-10 DIAGNOSIS — R63.5 WEIGHT GAIN: ICD-10-CM

## 2025-05-12 RX ORDER — TIRZEPATIDE 2.5 MG/.5ML
2.5 INJECTION, SOLUTION SUBCUTANEOUS WEEKLY
Qty: 2 ML | Refills: 0 | OUTPATIENT
Start: 2025-05-12 | End: 2025-06-09

## 2025-05-29 DIAGNOSIS — E55.9 VITAMIN D DEFICIENCY: ICD-10-CM

## 2025-06-01 RX ORDER — ERGOCALCIFEROL 1.25 MG/1
50000 CAPSULE, LIQUID FILLED ORAL WEEKLY
Qty: 4 CAPSULE | Refills: 0 | OUTPATIENT
Start: 2025-06-01

## 2025-06-09 DIAGNOSIS — E66.9 OBESITY (BMI 30-39.9): ICD-10-CM

## 2025-06-09 RX ORDER — TIRZEPATIDE 5 MG/.5ML
5 INJECTION, SOLUTION SUBCUTANEOUS WEEKLY
Qty: 2 ML | Refills: 0 | Status: SHIPPED | OUTPATIENT
Start: 2025-06-09 | End: 2025-08-04

## 2025-06-11 ENCOUNTER — TELEPHONE (OUTPATIENT)
Dept: BARIATRICS | Facility: CLINIC | Age: 45
End: 2025-06-11

## 2025-06-19 NOTE — PROGRESS NOTES
Assessment & Plan  Class 2 obesity    Initial weight: 211  Current weight: 193  TBW loss%: 8.5     Medication: Zepbound 5mg decreased to 2.5mg due to side effects of vomiting with 5mg     Patient understands the importance of making lifestyle changes as recommended below to aid in weight loss.      Dietary Recommendations:  Recommend to avoid skipping any meals. Adequate amount of Macronutrients (minimal 3 meals a day) is necessary to help improve metabolism, satiety and allow for better portion control by decreasing Ghrelin (hunger hormone). Lack of hunger can be suppressed by a hormone called Leptin (full hormone) which can occur from a previous meal or caffeine intake    Protein intake throughout the day can help promote satiety and is necessary for muscle growth/repair    Carbohydrates are essential as it is the vital source of fuel for daily activities. energy, cell function, nutrient absorption, and hormone production.     Fats: Essential vitamins like A, D, and E, support cell growth, function and are necessary for nutrient absorption to support your organs     Fluid intake which is at least half your body weight in ounces is necessary to help control cravings (decreasing confusion for appetite vs water deprivation) as the human body is made up of 50-70% of Fluids. If there is a diversion for water alone, would recommend flavored water (example-splash of lemonade or ice tea) to help promote compliance. Fluids include Teas, water, flavored water, seltzer water, coffee, shakes    Metabolism:    Metabolism can also be promoted by macronutrient intake and increased muscle weight via thermogenesis      Daily Calorie Needs: Recommend to take into account any fluid losses and calories burned via increased activity levels as daily calories may need to be adjusted     Weight check: Weights can fluctuate depending on fluid shifts vs what foods are consumed prior to checking your weight          Return in about 4  "months (around 10/21/2025).     Most recent notes, labs and previous medical records were reviewed. Total time with chart review and with the patient: 35 min      ______________________________________________________________________        Subjective:     Chief Complaint   Patient presents with    Follow-up     MWM F/U   Waist - 38.7        HPI: 45 y.o. female with pmh of  hypothyroidism presents for follow-up. Patient reports rash on her thigh and vomiting, nausea when taking 5mg of zepbound. No side effects with 2.5mg. Zepbound 5mg discontinued by another provider due to side effects     Diet Recall  B: banana and yogurt  L: sometimes skips or tuna/cracker or deli meat/cheese  D: varies- often chicken, vegetable    Hydration:3 x 16 oz bottles of water, glass milk, can of soda, 1 coffee black  Exercise:walking- 2 miles daily (40-60 minutes)  Occupation:  Sleep:7-9 hours        Lifestyle hx:  Exercise: walking     Review Of Systems:  General: No pallor  Pulmonary: Negative for shortness of breath  Chest: negative for chest pain  Gastrointestinal:  Negative for vomiting   Psychiatric/Behavioral:  Negative for behavioral problems, confusion, dysphoric mood and hallucinations.    All other systems reviewed and are negative.     Objective:  /65   Pulse 78   Temp 98 °F (36.7 °C)   Ht 5' 5\" (1.651 m)   Wt 87.9 kg (193 lb 12.8 oz)   LMP 05/11/2025 (Exact Date)   SpO2 98%   BMI 32.25 kg/m²     Wt Readings from Last 30 Encounters:   06/20/25 87.9 kg (193 lb 12.8 oz)   04/14/25 96.1 kg (211 lb 12.8 oz)   03/24/25 95.3 kg (210 lb)   03/15/25 94.8 kg (209 lb)   10/21/24 67.1 kg (148 lb)   04/26/24 67.1 kg (148 lb)   07/26/21 67.1 kg (148 lb)   08/28/18 68.5 kg (151 lb)       Physical Exam  Constitutional:       General: No acute distress.  Well-nourished  HENT:      Head: Normocephalic and atraumatic.   Eyes:      Extraocular Movements: Extraocular movements intact.      Conjunctiva/pupils: " Conjunctivae normal. Pupils are equal, round  Pulmonary:      Effort: Pulmonary effort is normal. No labored breathing   Neurological:      General: No focal deficit present.  AO x 3     Mental Status: Alert and oriented to person, place, and time. Mental status is at baseline.   Psychiatric:         Mood and Affect: Mood normal.         Behavior: Behavior normal.     Labs and Imaging  Recent labs and imaging have been personally reviewed.

## 2025-06-20 ENCOUNTER — OFFICE VISIT (OUTPATIENT)
Dept: BARIATRICS | Facility: CLINIC | Age: 45
End: 2025-06-20
Payer: COMMERCIAL

## 2025-06-20 ENCOUNTER — TELEPHONE (OUTPATIENT)
Dept: BARIATRICS | Facility: CLINIC | Age: 45
End: 2025-06-20

## 2025-06-20 VITALS
OXYGEN SATURATION: 98 % | DIASTOLIC BLOOD PRESSURE: 65 MMHG | WEIGHT: 193.8 LBS | HEART RATE: 78 BPM | TEMPERATURE: 98 F | BODY MASS INDEX: 32.29 KG/M2 | SYSTOLIC BLOOD PRESSURE: 120 MMHG | HEIGHT: 65 IN

## 2025-06-20 DIAGNOSIS — R11.0 NAUSEA: ICD-10-CM

## 2025-06-20 DIAGNOSIS — E89.0 POST-OPERATIVE HYPOTHYROIDISM: ICD-10-CM

## 2025-06-20 DIAGNOSIS — E66.09 CLASS 2 OBESITY DUE TO EXCESS CALORIES WITH BODY MASS INDEX (BMI) OF 38.0 TO 38.9 IN ADULT, UNSPECIFIED WHETHER SERIOUS COMORBIDITY PRESENT: Primary | Chronic | ICD-10-CM

## 2025-06-20 DIAGNOSIS — E66.812 CLASS 2 OBESITY DUE TO EXCESS CALORIES WITH BODY MASS INDEX (BMI) OF 38.0 TO 38.9 IN ADULT, UNSPECIFIED WHETHER SERIOUS COMORBIDITY PRESENT: Primary | Chronic | ICD-10-CM

## 2025-06-20 PROCEDURE — 99214 OFFICE O/P EST MOD 30 MIN: CPT | Performed by: STUDENT IN AN ORGANIZED HEALTH CARE EDUCATION/TRAINING PROGRAM

## 2025-06-20 RX ORDER — ONDANSETRON 4 MG/1
4 TABLET, FILM COATED ORAL EVERY 8 HOURS PRN
Qty: 20 TABLET | Refills: 0 | Status: SHIPPED | OUTPATIENT
Start: 2025-06-20 | End: 2025-06-24

## 2025-06-20 RX ORDER — TIRZEPATIDE 2.5 MG/.5ML
2.5 INJECTION, SOLUTION SUBCUTANEOUS WEEKLY
Qty: 2 ML | Refills: 4 | Status: SHIPPED | OUTPATIENT
Start: 2025-06-20

## 2025-06-20 NOTE — TELEPHONE ENCOUNTER
PA for Zepbound 2.5 mg SUBMITTED to [employer]    via    [x]CMM-KEY: [id number]  []Surescripts-Case ID #   []Availity-Auth ID # NDC #   []Faxed to plan   []Other website   []Phone call Case ID #     []PA sent as URGENT    All office notes, labs and other pertaining documents and studies sent. Clinical questions answered. Awaiting determination from insurance company.     Turnaround time for your insurance to make a decision on your Prior Authorization can take 7-21 business days.

## 2025-06-23 DIAGNOSIS — E89.0 POST-OPERATIVE HYPOTHYROIDISM: ICD-10-CM

## 2025-06-23 DIAGNOSIS — E66.09 CLASS 2 OBESITY DUE TO EXCESS CALORIES WITH BODY MASS INDEX (BMI) OF 38.0 TO 38.9 IN ADULT, UNSPECIFIED WHETHER SERIOUS COMORBIDITY PRESENT: Chronic | ICD-10-CM

## 2025-06-23 DIAGNOSIS — E66.812 CLASS 2 OBESITY DUE TO EXCESS CALORIES WITH BODY MASS INDEX (BMI) OF 38.0 TO 38.9 IN ADULT, UNSPECIFIED WHETHER SERIOUS COMORBIDITY PRESENT: Chronic | ICD-10-CM

## 2025-06-23 NOTE — TELEPHONE ENCOUNTER
PA for Zepbound 2.5 mg APPROVED     Date(s) approved 2/17/2025-11/16/2025    Patient advised by          [x]MyChart Message  []Phone call   []LMOM  []L/M to call office as no active Communication consent on file  []Unable to leave detailed message as VM not approved on Communication consent       Pharmacy advised by    [x]Fax  []Phone call  []Secure Chat    Specialty Pharmacy    []     Approval letter scanned into Media Yes

## 2025-06-24 RX ORDER — ONDANSETRON 4 MG/1
4 TABLET, FILM COATED ORAL EVERY 8 HOURS PRN
Qty: 20 TABLET | Refills: 0 | Status: SHIPPED | OUTPATIENT
Start: 2025-06-24

## 2025-07-24 DIAGNOSIS — E66.9 OBESITY (BMI 30-39.9): ICD-10-CM

## 2025-07-25 RX ORDER — TIRZEPATIDE 5 MG/.5ML
5 INJECTION, SOLUTION SUBCUTANEOUS WEEKLY
Qty: 2 ML | Refills: 0 | OUTPATIENT
Start: 2025-07-25 | End: 2025-09-19

## 2025-08-20 DIAGNOSIS — E55.9 VITAMIN D DEFICIENCY: ICD-10-CM

## 2025-08-20 RX ORDER — ERGOCALCIFEROL 1.25 MG/1
50000 CAPSULE, LIQUID FILLED ORAL WEEKLY
Qty: 4 CAPSULE | Refills: 0 | OUTPATIENT
Start: 2025-08-20